# Patient Record
Sex: MALE
[De-identification: names, ages, dates, MRNs, and addresses within clinical notes are randomized per-mention and may not be internally consistent; named-entity substitution may affect disease eponyms.]

---

## 2020-03-21 NOTE — HPE
DATE OF ADMISSION:  03/21/2020

 

REASON FOR CONSULTATION:  Right lower quadrant pain.

 

HISTORY OF PRESENT ILLNESS:  The patient is a 24-year-old male who presented with

pain that started around room one o'clock this morning in the right lower

quadrant. It got progressively worse.  He could not tolerate any food this

morning.  Had nausea and vomiting with breakfast.  He eventually came into the

emergency room for evaluation.  In the emergency room (ER) he had tenderness to

palpation.  CT was done, which confirmed the diagnosis of acute appendicitis.  He

also had slightly elevated white count of 12,000.  Therefore, I was called to

evaluate.  Denies any recent travel or trauma to the abdomen.  No recent change

in medications and no recent illnesses.

 

PAST MEDICAL HISTORY:  Negative.

 

PAST SURGICAL HISTORY: [place] teeth removal.

 

FAMILY HISTORY:  Noncontributory.

 

ALLERGIES:  None.

 

MEDICATIONS:  None.

 

SOCIAL HISTORY:  Denies drug, alcohol, tobacco abuse.

 

FAMILY HISTORY:  Noncontributory.

 

REVIEW OF SYSTEMS:  Pertinent positives and negatives as stated in the history of

present illness (HPI).

 

PHYSICAL EXAMINATION:

GENERAL:  Alert and oriented times three.  No acute distress.

VITAL SIGNS:  Temperature 99.4, pulse 61, respirations 18, blood pressure 153/70,

pulse oximetry 100% on room air.

HEENT:  Pupils equally round and react to light and accommodation.

HEART:  S1, S2, regular rate and rhythm.

LUNGS: Clear to auscultation bilaterally.

ABDOMEN:  Soft.  Tender to palpation in right lower quadrant.  Localized

guarding. No rigidity.

EXTREMITIES:  No clubbing, cyanosis, or edema.

 

LABORATORY DATA:

White count 12.7, hemoglobin 16, platelets 194.

 

IMAGING STUDIES:

CT abdomen and pelvis was done, which showed dilated fluid-filled appendix, 19 mm

in diameter with minimal periappendiceal stranding and adjacent reactive

adenopathy.  No signs of bowel obstruction.

 

ASSESSMENT AND PLAN:  Patient is a 24-year-old male with acute appendicitis.

Recommendation is to proceed laparoscopic appendectomy.  Risks and benefits of

procedure not limited to, but including, bleeding, infection, hernia formation,

damage to surrounding structure, need for further surgery were discussed in

detail with the patient. Informed consent was obtained.  Procedure was planned.

Postoperatively will keep him overnight if the appendix looks severely inflamed

or if any signs of perforation intraoperatively, otherwise will plan to discharge

him home this evening.

## 2020-03-21 NOTE — REP
Clinical:  Right lower quadrant pain.

 

Technique:  Axial contrast enhanced images from the lung bases to the pubic

symphysis using 100 ml Isovue 370 intravenous contrast material with coronal and

sagittal re-formations.

 

Findings:

Dilated fluid-filled appendix measures up to 19 mm maximal diameter with minimal

periappendiceal stranding and adjacent reactive adenopathy (axial images 87 -

113).  No bowel obstruction.  No free air or or significant free fluid/drainable

collection.  Scattered sigmoid diverticula noted without acute diverticulitis.

 

Liver, spleen, pancreas, gallbladder, bilateral adrenal glands and kidneys are

normal.  Further evaluation the pelvis demonstrates normal bladder and age

appropriate prostate/seminal vesicles.  Abdominal aorta and vasculature without

aneurysm or dissection.  Musculoskeletal structures are intact.  Lung bases are

clear.

 

Impression:

Acute appendicitis with fluid-filled dilated appendix measuring up to 19 mm

diameter.  No associated bowel obstruction, perforation, free air, free fluid or

drainable collection/abscess.

 

 

Electronically Signed by

Rodolfo Espinoza MD 03/21/2020 04:41 P

## 2020-03-22 NOTE — RO
DATE OF PROCEDURE:  03/21/2020

 

PREOPERATIVE DIAGNOSIS:  Acute appendicitis.

 

POSTOPERATIVE DIAGNOSIS:  Acute appendicitis.

 

PROCEDURE:  Laparoscopic appendectomy.

 

SURGEON:  Dr. Crescencio Gallegos

 

ASSISTANT:  None.

 

ANESTHESIA:  General.

 

ESTIMATED BLOOD LOSS:  5 mL.

 

COMPLICATIONS:  None.

 

INDICATIONS FOR PROCEDURE:  The patient is a 24-year-old male who presents with

acute appendicitis.  Recommendation was to proceed with laparoscopic

appendectomy.  Risks and benefits of the procedure not limited to, but including

bleeding, infection, hernia formation, damage to surrounding structures, need for

further surgery were discussed in detail with the patient.  Informed consent was

obtained and procedure was planned.

 

DESCRIPTION OF PROCEDURE:  The patient was back to operating room three, after

sufficient sedation, the abdomen was sterilely prepped and draped.  Next, a

time-out was done to confirm proper patient and proper procedure.  Next, a 5 mm

incision made in left upper quadrant, Veress needle was inserted and the abdomen

was insufflated to 15 mmHg.  The Veress needle was then removed and a 5 mm

Optiview port was used to gain access to the abdomen.  Once the abdomen was

entered, 8 mm port was placed supraumbilically in midline, another 5 mm port

suprapubically in the midline.  The appendix was then identified in the right

lower quadrant; it was very large but the base was soft and compressible.  I was

able to dissect through the mesoappendix using the Enseal to the base, two PDS

Endoloops were placed around the base of the appendix and then the appendix was

amputated using the Enseal.  Appendix was then placed inside of 5 mm EndoCatch

bag and brought out through the supraumbilical port site.  Once the appendix was

removed, the fascia at the umbilical port site was closed with a figure-of-eight

#0 Vicryl suture using a Harley-Floyd needle.  Once that was completed, the

abdomen was desufflated.  The skin incisions were closed with #4-0 Vicryl

subcuticular sutures.  The abdomen was cleaned and dried.  Steri-Strips, 4x4 and

tape were applied thus ending procedure.

## 2020-12-17 ENCOUNTER — HOSPITAL ENCOUNTER (OUTPATIENT)
Dept: HOSPITAL 53 - M RAD | Age: 25
End: 2020-12-17
Attending: PHYSICIAN ASSISTANT
Payer: COMMERCIAL

## 2020-12-17 DIAGNOSIS — R10.11: Primary | ICD-10-CM

## 2020-12-17 PROCEDURE — 78708 K FLOW/FUNCT IMAGE W/DRUG: CPT

## 2020-12-17 NOTE — REP
INDICATION:

R UPPER QUAD PAIN. Evaluate renal function following right ureteral stent placement

October 23, 2020 followed by removal 2 weeks later, November 10, 2020.  Rule out

obstruction on the right side.



COMPARISON:

Comparison CT study is from March 21, 2020..



TECHNIQUE:

8.7 mCi of Technetium-99m MAG3 is injected and sequential posterior flow and excretory

phase imaging are acquired.  Renal cortical regions of interest are drawn and time

activity curves are plotted for renal functional analysis.  20 mg of intravenous Lasix

is administered and post Lasix renal agger fee is carried out.  Pre and postvoid

images including the kidneys and bladder were acquired.



FINDINGS:

Posterior flow study shows symmetric perfusion of the kidneys bilaterally.

Symmetrical function and symmetric appearance of the collecting systems is observed

bilaterally, 3 minutes.



Differential function analysis is asymmetric with 51.5 % of overall renal cortical

counts coming from the left kidney and 48.5 % coming from the right.  Time to peak

activity is normal at 2.0 minutes bilaterally.  Time to half max activity is normal

bilaterally measured at 8.3 minutes on the left and  9.0 minutes on the right.  Pre

and postvoid images are unremarkable.  There is good bladder emptying



Post Lasix Sharon fee shows good clearance of the upper pole collecting systems after

Lasix.  Time to half Lasix activity on the left is 5.7 minutes and on the right 6.6

minutes.  There is no evidence of obstructive uropathy on either side.



IMPRESSION:

Normal nuclear renal scintigraphy with differential flow and function analysis and

post Lasix renal agger fee.  No evidence to suggest obstructive uropathy on either

side.





<Electronically signed by Magan Cannon > 12/17/20 [id number]

## 2021-05-20 ENCOUNTER — HOSPITAL ENCOUNTER (OUTPATIENT)
Dept: HOSPITAL 53 - M RAD | Age: 26
End: 2021-05-20
Attending: PAIN MEDICINE
Payer: COMMERCIAL

## 2021-05-20 DIAGNOSIS — M47.817: Primary | ICD-10-CM

## 2021-05-20 DIAGNOSIS — M47.814: ICD-10-CM

## 2021-05-21 NOTE — REP
INDICATION:

SPONDYLOSIS W/O RADICULOPATHY.



COMPARISON:

None.



TECHNIQUE:

Sagittal and axial T1 and T2-weighted scans are acquired in the usual fashion with and

without fat saturation.  Sequences include spin echo, turbo spin-echo, and STIR

imaging sequences.



FINDINGS:

Thoracic vertebral body heights are preserved.  Alignment is normal.  No fracture or

collapse is seen.  Cortical and medullary bone signal intensity are normal.  The

thoracic spinal cord is normal in course, caliber, and signal intensity on T1 and T2

weighted scans.  The tip of the conus medullaris appears to be at L1 at the bottom

edge of the imaging field of view.



The left T1 to neural foramen is widened and there is a dumbbell-shaped lesion within

the widened left neural foramen consistent with schwannoma.  This is intermediate in

signal intensity on T1 and slightly hyperintense relative to skeletal muscle on T2

weighted scans.  It does not have simple fluid signal intensity characteristics.  It

measures 2.4 cm in oblique medial to lateral dimension by 1.2 cm in anteroposterior

dimension.  Although it widens the neural foramen, it does not appear to compress or

displace the thecal sac.  No intradural component is appreciated.  No other mass

lesion is seen.



There are minimal degenerative disc changes at T2-3, T5-6, T6-7, T7-8, and T8-9.

These disc spaces show minimal narrowing.  There is a very small focal disc protrusion

to the right of midline at T2-3.  There is also a small right posterior focal disc

protrusion at T5-6 which flattens the right ventral margin of the cord.  There is a

tiny central focal disc protrusion at T7-8 and another tiny central focal disc

protrusion at T8-9.  There is slight disc bulging at T9-10.  No spinal stenosis is

seen.  No cord compression at any of these levels.  No other neural foraminal lesion

is appreciated.



IMPRESSION:

1. There is a dumbbell-shaped left-sided neural foraminal lesion most compatible with

schwannoma.  This widens the bony neural foramen and measures 2.4 cm in greatest

diameter.  Recommend dedicated pre and postcontrast MR imaging centered at the

cervicothoracic junction.

2. Mild degenerative disc changes with multilevel small focal disc protrusions as

above.





<Electronically signed by Magan Cannon > 05/21/21 [id number]

## 2021-05-21 NOTE — REP
INDICATION:

SPONDYLOSIS W/O RADICULOPATHY.



COMPARISON:

None.



TECHNIQUE:

Sagittal and axial T1 and T2-weighted scans are acquired in the usual fashion with and

without fat saturation.  Sequences include spin echo, turbo spin-echo, and STIR

imaging sequences.



FINDINGS:

Lumbar vertebral body heights are preserved.  Alignment is normal.  There is no

evidence of spondylolysis or spondylolisthesis.  The tip of the conus medullaris is

normal in position and appearance at the lower margin of L1.  No extra vertebral

abnormality is appreciated.  There is a Schmorl's node at the superior endplate of the

L5 vertebral body.  No fracture or collapse is seen.



Axial and sagittal images at the L5-S1 level demonstrate degenerative narrowing and

decreased signal intensity in the disc.  There is broad-based disc bulging which

effaces the ventral epidural fat but does not compress the cord.  No foraminal

narrowing is seen at L5-S1.



At L4-5, there is a small left posterior focal disc protrusion compressing the left

ventral margin of the thecal sac and contacting the left 5th lumbar root.  No neural

foraminal narrowing is seen.  No spinal stenosis is noted.



At L3-4, there is minimal diffuse disc bulging.  There is mild decreased disc space

height and signal intensity.  No thecal sac compression.  No neural foraminal

narrowing.



The L2-3 and L1-2 disc levels are unremarkable.







IMPRESSION:

There is a left posterior focal disc protrusion at L4-5 with mild thecal sac

compression.  Degenerative disc changes are noted at L5-S1 and to a lesser extent at

L3-4.





<Electronically signed by Magan Cannon > 05/21/21 [id number]

## 2021-06-24 ENCOUNTER — HOSPITAL ENCOUNTER (OUTPATIENT)
Dept: HOSPITAL 53 - M RAD | Age: 26
End: 2021-06-24
Attending: PHYSICIAN ASSISTANT
Payer: COMMERCIAL

## 2021-06-24 DIAGNOSIS — N13.30: Primary | ICD-10-CM

## 2021-06-24 PROCEDURE — 78708 K FLOW/FUNCT IMAGE W/DRUG: CPT

## 2021-06-24 NOTE — REP
INDICATION:

HX DISTAL HYDROURETER, COMPARE TO PRIOR.



COMPARISON:

12/17/2020



TECHNIQUE/RADIOTRACER AND DOSE:

After the intravenous administration of 8.8 mCi of technetium 99 M Mag 3 a renal flow

and scan was performed.  20 mg of intravenous Lasix was administered to obtain the

Lasix augmented portion of the exam.



FINDINGS:

The renal flow portion of the examination shows prompt visualization of the abdominal

aorta with symmetric distribution of the radiotracer throughout both kidneys in a

fashion unchanged from the prior exam.  The dynamic renal scan again shows

distribution of the radiotracer in both renal collecting systems with evidence of

adequate washout characteristics of the renal parenchyma bilaterally.  The appearance

of this is stable.



The functional renogram curves shows time to peak activity to the right kidney to be

less than 1 minute and time to peak activity for the left kidney to be 2 minutes.

Both values are normal.

T 1/2 right kidney is 7.5 minutes

T 1/2 right kidney 9 minutes

Both values are within normal limits

Pre and post Lasix administration scintigraphic evaluation shows excellent washout of

the radiotracer in the renal collecting system bilaterally

Split differential analysis shows 41.3% of the counts coming from the left kidney and

58.7% calcium and from the right kidney



IMPRESSION:

Essentially unchanged renal flow in scan which is within normal limits as described

above.





<Electronically signed by Pino Orr > 06/24/21 [id number]

## 2021-07-10 ENCOUNTER — HOSPITAL ENCOUNTER (OUTPATIENT)
Dept: HOSPITAL 53 - M RAD | Age: 26
End: 2021-07-10
Attending: STUDENT IN AN ORGANIZED HEALTH CARE EDUCATION/TRAINING PROGRAM
Payer: COMMERCIAL

## 2021-07-10 DIAGNOSIS — D33.4: Primary | ICD-10-CM

## 2021-07-10 DIAGNOSIS — M54.9: ICD-10-CM

## 2021-07-10 PROCEDURE — 72157 MRI CHEST SPINE W/O & W/DYE: CPT

## 2021-07-10 PROCEDURE — 72156 MRI NECK SPINE W/O & W/DYE: CPT

## 2021-07-10 NOTE — REPVR
PROCEDURE INFORMATION: 

Exam: MR Cervical Spine Without and With Contrast 

Exam date and time: 7/10/2021 9:57 AM 

Age: 25 years old 

Clinical indication: Neck pain; Patient HX: Entire spine pain, nki; Additional 

info: Back pain 



TECHNIQUE: 

Imaging protocol: Multiplanar magnetic resonance images of the cervical spine 

without and with contrast. 

Contrast material: PROHANCE; Contrast volume: 19 ml; Contrast route: 

INTRAVENOUS (IV);  



COMPARISON: 

MRI-Spine,Thoracic without con 5/20/2021 5:15 PM 



FINDINGS: 

Vertebrae: Straightening of the normal cervical lordosis may be related to 

patient position or due to muscle spasm.  There is preservation of vertebral 

body height.  There is no subluxation.

Disc spaces: There is loss of T2 signal within the intervertebral disc spaces, 

related to degenerative disc disease.

Spinal cord: Normal signal. No cord compression. 

C2-C3: No significant disc disease. No significant spinal stenosis. 

C3-C4: No significant disc disease. No significant spinal stenosis. 

C4-C5: No significant disc disease. No significant spinal stenosis. 

C5-C6:  No significant disc disease. No significant spinal stenosis. 

C6-C7:  There is a small bulge without mass effect. No significant spinal 

stenosis. 

C7-T1: There is a small leftward bulge without mass effect. No significant 

spinal stenosis. 

Soft tissues: Unremarkable. 



Vertebral arteries: Expected flow voids in the vertebral arteries. 



Contrast:  There is no abnormal parenchymal or meningeal enhancement.



IMPRESSION: 

1. There is no cervical disc herniation or cervical central stenosis. 

2. Straightening of the normal cervical lordosis may be related to patient 

position or due to muscle spasm. 



Electronically signed by: Tyron Knox On 07/10/2021  12:32:13 PM

## 2021-07-10 NOTE — REPVR
PROCEDURE INFORMATION: 

Exam: MR Thoracic Spine Without and With Contrast 

Exam date and time: 7/10/2021 9:57 AM 

Age: 25 years old 

Clinical indication: Pain in thoracic intervertebral disc disorder; With 

radiculopathy; Bilateral; Patient HX: Entire spine pain, nki; Additional info: 

Back pain 



TECHNIQUE: 

Imaging protocol: Multiplanar magnetic resonance images of the thoracic spine 

without and with intravenous contrast. 

Contrast material: PROHANCE; Contrast volume: 19 ml; Contrast route: 

INTRAVENOUS (IV);  



COMPARISON: 

MRI-Spine,Thoracic without con 5/20/2021 5:15 PM 



FINDINGS: 

Vertebrae: Unremarkable. 

Disc spaces: There is slight loss of disc space height and there is loss of T2 

signal within intervertebral disc spaces, related to degenerative disc disease.

Spinal cord: Normal signal. No cord compression. 

T1-T2:  No significant disc disease.  There is an enhancing dumbbell-shaped 

left foraminal mass with foraminal expansion measuring up to 2.5 cm consistent 

with a schwannoma.  This partially effaces the left lateral CSF space but does 

not reach the cord.  No significant spinal canal stenosis. 

T2-T3:  There is a small rightward bulge without mass effect. No significant 

spinal canal stenosis. 

T3-T4:  No significant disc disease. No significant spinal canal stenosis. 

T4-T5:  No significant disc disease. No significant spinal canal stenosis. 

T5-T6:  There is a small rightward bulge without mass effect. No significant 

spinal canal stenosis. 

T6-T7:  No significant disc disease. No significant spinal canal stenosis. 

T7-T8:  There is a small central protrusion which does reach cord. No 

significant spinal canal stenosis. 

T8-T9:  No significant disc disease. No significant spinal canal stenosis. 

T9-T10:  There is a small rightward bulge without mass effect. No significant 

spinal canal stenosis. 

T10-T11:  No significant disc disease. No significant spinal canal stenosis. 

T11-T12:  No significant disc disease. No significant spinal canal stenosis. 

Soft tissues: Unremarkable.  There is no other focus of abnormal enhancement.



IMPRESSION: 

1. Stable 2.5 cm left foraminal mass at T1-T2 a demonstrate enhancement and is 

consistent with a schwannoma. 

2. Stable mild degenerative changes.  There is no thoracic disc herniation or 

central stenosis.



Electronically signed by: Tyron Knox On 07/10/2021  12:42:09 PM